# Patient Record
Sex: FEMALE | Race: ASIAN | Employment: OTHER | URBAN - METROPOLITAN AREA
[De-identification: names, ages, dates, MRNs, and addresses within clinical notes are randomized per-mention and may not be internally consistent; named-entity substitution may affect disease eponyms.]

---

## 2017-12-04 ENCOUNTER — HOSPITAL ENCOUNTER (EMERGENCY)
Facility: HOSPITAL | Age: 22
Discharge: HOME | End: 2017-12-04
Attending: EMERGENCY MEDICINE
Payer: COMMERCIAL

## 2017-12-04 VITALS
HEIGHT: 63 IN | WEIGHT: 131 LBS | DIASTOLIC BLOOD PRESSURE: 75 MMHG | HEART RATE: 82 BPM | BODY MASS INDEX: 23.21 KG/M2 | SYSTOLIC BLOOD PRESSURE: 122 MMHG | RESPIRATION RATE: 16 BRPM | OXYGEN SATURATION: 100 % | TEMPERATURE: 98 F

## 2017-12-04 DIAGNOSIS — R07.0 THROAT PAIN IN ADULT: Primary | ICD-10-CM

## 2017-12-04 PROCEDURE — 80048 BASIC METABOLIC PNL TOTAL CA: CPT | Performed by: EMERGENCY MEDICINE

## 2017-12-04 PROCEDURE — 99283 EMERGENCY DEPT VISIT LOW MDM: CPT

## 2017-12-04 PROCEDURE — 36415 COLL VENOUS BLD VENIPUNCTURE: CPT

## 2017-12-04 PROCEDURE — 83735 ASSAY OF MAGNESIUM: CPT | Performed by: EMERGENCY MEDICINE

## 2017-12-04 NOTE — ED PROVIDER NOTES
Patient Seen in: [facility] AND [facility] Emergency Department    History   Patient presents with: Anxiety/Panic attack (neurologic)    Stated Complaint: anxiety/jarrett    HPI    42-year-old female presents via EMS for possible anxiety attack.   Patient reports th systems reviewed and negative except as noted above.     Physical Exam   ED Triage Vitals [12/04/17 1446]  BP: 116/70  Pulse: 74  Resp: 22  Temp: 98 °F (36.7 °C)  Temp src: n/a  SpO2: 98 %  O2 Device: n/a    Current:/76   Pulse 72   Temp 98 °F (36.7 ° albuterol versus some muscular spasms.   She is to follow-up with a primary care provider for possible referral to GI doctor for upper endoscopy should symptoms persist.  At discharge she is non-toxic, well hydrated, tolerating PO and in no respiratory dist

## (undated) NOTE — ED AVS SNAPSHOT
Jonathan Westbrook   MRN: [id number]    Department:  [facility] Emergency Department   Date of Visit:  12/4/2017           Disclosure     Insurance plans vary and the physician(s) referred by the ER may not be covered by your plan.  Please contact yo CARE PHYSICIAN AT ONCE OR RETURN IMMEDIATELY TO THE EMERGENCY DEPARTMENT. If you have been prescribed any medication(s), please fill your prescription right away and begin taking the medication(s) as directed.   If you believe that any of the medications